# Patient Record
Sex: MALE | Race: BLACK OR AFRICAN AMERICAN | NOT HISPANIC OR LATINO | URBAN - METROPOLITAN AREA
[De-identification: names, ages, dates, MRNs, and addresses within clinical notes are randomized per-mention and may not be internally consistent; named-entity substitution may affect disease eponyms.]

---

## 2017-07-21 ENCOUNTER — EMERGENCY (EMERGENCY)
Facility: HOSPITAL | Age: 27
LOS: 1 days | Discharge: HOME | End: 2017-07-21
Attending: EMERGENCY MEDICINE | Admitting: EMERGENCY MEDICINE
Payer: MEDICAID

## 2017-07-21 VITALS
TEMPERATURE: 99 F | OXYGEN SATURATION: 97 % | DIASTOLIC BLOOD PRESSURE: 84 MMHG | RESPIRATION RATE: 18 BRPM | HEART RATE: 121 BPM | SYSTOLIC BLOOD PRESSURE: 153 MMHG

## 2017-07-21 PROCEDURE — 93010 ELECTROCARDIOGRAM REPORT: CPT

## 2017-07-21 PROCEDURE — 99285 EMERGENCY DEPT VISIT HI MDM: CPT | Mod: 25

## 2017-07-21 NOTE — ED ADULT TRIAGE NOTE - CHIEF COMPLAINT QUOTE
Pt states he had ambulatory surgery on his L shoulder today and since arriving home has been experiencing palpitations.  Pt denies CP/SOB.

## 2017-07-22 VITALS
SYSTOLIC BLOOD PRESSURE: 155 MMHG | DIASTOLIC BLOOD PRESSURE: 80 MMHG | RESPIRATION RATE: 18 BRPM | OXYGEN SATURATION: 100 % | HEART RATE: 99 BPM

## 2017-07-22 DIAGNOSIS — M75.102 UNSPECIFIED ROTATOR CUFF TEAR OR RUPTURE OF LEFT SHOULDER, NOT SPECIFIED AS TRAUMATIC: Chronic | ICD-10-CM

## 2017-07-22 LAB
ALBUMIN SERPL ELPH-MCNC: 4.5 G/DL — SIGNIFICANT CHANGE UP (ref 3.3–5)
ALP SERPL-CCNC: 45 U/L — SIGNIFICANT CHANGE UP (ref 40–120)
ALT FLD-CCNC: 81 U/L — HIGH (ref 4–41)
AST SERPL-CCNC: 116 U/L — HIGH (ref 4–40)
BASOPHILS # BLD AUTO: 0.01 K/UL — SIGNIFICANT CHANGE UP (ref 0–0.2)
BASOPHILS NFR BLD AUTO: 0.1 % — SIGNIFICANT CHANGE UP (ref 0–2)
BILIRUB SERPL-MCNC: 0.2 MG/DL — SIGNIFICANT CHANGE UP (ref 0.2–1.2)
BUN SERPL-MCNC: 14 MG/DL — SIGNIFICANT CHANGE UP (ref 7–23)
CALCIUM SERPL-MCNC: 7.4 MG/DL — LOW (ref 8.4–10.5)
CHLORIDE SERPL-SCNC: 97 MMOL/L — LOW (ref 98–107)
CO2 SERPL-SCNC: 24 MMOL/L — SIGNIFICANT CHANGE UP (ref 22–31)
CREAT SERPL-MCNC: 1.1 MG/DL — SIGNIFICANT CHANGE UP (ref 0.5–1.3)
EOSINOPHIL # BLD AUTO: 0 K/UL — SIGNIFICANT CHANGE UP (ref 0–0.5)
EOSINOPHIL NFR BLD AUTO: 0 % — SIGNIFICANT CHANGE UP (ref 0–6)
GLUCOSE SERPL-MCNC: 116 MG/DL — HIGH (ref 70–99)
HCT VFR BLD CALC: 44.5 % — SIGNIFICANT CHANGE UP (ref 39–50)
HGB BLD-MCNC: 14.4 G/DL — SIGNIFICANT CHANGE UP (ref 13–17)
IMM GRANULOCYTES # BLD AUTO: 0.05 # — SIGNIFICANT CHANGE UP
IMM GRANULOCYTES NFR BLD AUTO: 0.5 % — SIGNIFICANT CHANGE UP (ref 0–1.5)
LYMPHOCYTES # BLD AUTO: 1.28 K/UL — SIGNIFICANT CHANGE UP (ref 1–3.3)
LYMPHOCYTES # BLD AUTO: 13.6 % — SIGNIFICANT CHANGE UP (ref 13–44)
MCHC RBC-ENTMCNC: 26.1 PG — LOW (ref 27–34)
MCHC RBC-ENTMCNC: 32.4 % — SIGNIFICANT CHANGE UP (ref 32–36)
MCV RBC AUTO: 80.6 FL — SIGNIFICANT CHANGE UP (ref 80–100)
MONOCYTES # BLD AUTO: 0.43 K/UL — SIGNIFICANT CHANGE UP (ref 0–0.9)
MONOCYTES NFR BLD AUTO: 4.6 % — SIGNIFICANT CHANGE UP (ref 2–14)
NEUTROPHILS # BLD AUTO: 7.62 K/UL — HIGH (ref 1.8–7.4)
NEUTROPHILS NFR BLD AUTO: 81.2 % — HIGH (ref 43–77)
NRBC # FLD: 0 — SIGNIFICANT CHANGE UP
PLATELET # BLD AUTO: 315 K/UL — SIGNIFICANT CHANGE UP (ref 150–400)
PMV BLD: 10.6 FL — SIGNIFICANT CHANGE UP (ref 7–13)
POTASSIUM SERPL-MCNC: SIGNIFICANT CHANGE UP MMOL/L (ref 3.5–5.3)
POTASSIUM SERPL-SCNC: SIGNIFICANT CHANGE UP MMOL/L (ref 3.5–5.3)
PROT SERPL-MCNC: 9.7 G/DL — HIGH (ref 6–8.3)
RBC # BLD: 5.52 M/UL — SIGNIFICANT CHANGE UP (ref 4.2–5.8)
RBC # FLD: 17 % — HIGH (ref 10.3–14.5)
SODIUM SERPL-SCNC: 129 MMOL/L — LOW (ref 135–145)
TSH SERPL-MCNC: 0.48 UIU/ML — SIGNIFICANT CHANGE UP (ref 0.27–4.2)
WBC # BLD: 9.39 K/UL — SIGNIFICANT CHANGE UP (ref 3.8–10.5)
WBC # FLD AUTO: 9.39 K/UL — SIGNIFICANT CHANGE UP (ref 3.8–10.5)

## 2017-07-22 RX ORDER — SODIUM CHLORIDE 9 MG/ML
2000 INJECTION INTRAMUSCULAR; INTRAVENOUS; SUBCUTANEOUS ONCE
Qty: 0 | Refills: 0 | Status: COMPLETED | OUTPATIENT
Start: 2017-07-22 | End: 2017-07-22

## 2017-07-22 RX ADMIN — SODIUM CHLORIDE 2000 MILLILITER(S): 9 INJECTION INTRAMUSCULAR; INTRAVENOUS; SUBCUTANEOUS at 00:46

## 2017-07-22 NOTE — ED ADULT NURSE NOTE - OBJECTIVE STATEMENT
27 years old male s/p left shoulder surgery this morning presents with complaints of palpitations for a duration of 2hrs30 mins today. Denies fever, chills. On arrival, patient is alert and oriented x 4, clear speech, no use of accessory muscles noted. left shoulder dressing dry and intact, left arm brace in place, 20 gauge saline lock inserted on right metacarpal, blood drawn and sent. Will follow up and monitor.

## 2017-07-22 NOTE — ED SUB INTERN NOTE - MEDICAL DECISION MAKING DETAILS
Due to previous occurances when hot, patients limited water intake, and the patients comfortable appearance and improvement of symptoms, this is likely tachycardia due to dehydration. Patient will get 500ml of NS and will be watched for improvement. PE, arrythmia, and anemia were all considered. Patient will also get a TSH to screen for thyroid dysfunction as a cause for the tachycardia and increased BP.

## 2017-07-22 NOTE — ED SUB INTERN NOTE - OBJECTIVE STATEMENT FT
26yo M with no PMH and h/o rotator cuff surgery yesterday presenting with "heart racing in chest" that began around 5pm and lasted 2.5 hours. Patient is currently not experiencing sxs. Patient denies any dizziness, HA, SOB, CP or tightness, n/v, fever, diaphoresis, or leg pain. Patient reports 2 previous events when the weather has been hot. No h/o early cardiac death in the family. Patient had an EKG that showed sinus tachycardia in triage. Patient reports that water intake recently has been minimal. Patient takes no medications and reports no allergies. 26yo M with no PMH and h/o rotator cuff surgery yesterday presenting with "heart racing in chest" that began around 5pm. Patient is currently not experiencing sxs. Patient denies any dizziness, HA, SOB, CP or tightness, n/v, fever, diaphoresis, or leg pain. Patient reports 2 previous events when the weather has been hot. No h/o early cardiac death in the family. Patient had an EKG that showed sinus tachycardia in triage. Patient reports that water intake recently has been minimal. Patient takes no medications and reports no allergies.

## 2017-07-22 NOTE — ED PROVIDER NOTE - CONSTITUTIONAL, MLM
normal... Well appearing, DMM, awake, alert, oriented to person, place, time/situation and in no apparent distress.

## 2017-07-22 NOTE — ED PROVIDER NOTE - OBJECTIVE STATEMENT
26yo M with no significant past medical history except rotator cuff surgery today (not sure but LEFT shoulder surgery) presenting with palpitations that began around 5pm. Patient is currently asymptomatic. Patient denies any dizziness, HA, SOB, chest pain or tightness, n/v, fever, diaphoresis, or leg pain. Patient reports 2 previous similar events when the weather has been hot. No h/o early cardiac death in the family. Patient had an EKG that showed sinus tachycardia in triage. Patient reports that water intake recently has been minimal. NPO from midnight last night till post-op for surgery today at 12:30pm.  Patient takes no medications 9including no pain meds) and reports no allergies.

## 2017-07-22 NOTE — ED ADULT NURSE NOTE - CHPI ED SYMPTOMS NEG
no syncope/no nausea/no dizziness/no diaphoresis/no fever/no cough/no back pain/no chest pain/no chills/no shortness of breath/no vomiting

## 2017-11-01 ENCOUNTER — OUTPATIENT (OUTPATIENT)
Dept: OUTPATIENT SERVICES | Facility: HOSPITAL | Age: 27
LOS: 1 days | End: 2017-11-01
Payer: MEDICAID

## 2017-11-01 DIAGNOSIS — M75.102 UNSPECIFIED ROTATOR CUFF TEAR OR RUPTURE OF LEFT SHOULDER, NOT SPECIFIED AS TRAUMATIC: Chronic | ICD-10-CM

## 2017-11-01 PROCEDURE — G9001: CPT

## 2017-11-13 ENCOUNTER — EMERGENCY (EMERGENCY)
Facility: HOSPITAL | Age: 27
LOS: 1 days | Discharge: HOME | End: 2017-11-13
Admitting: EMERGENCY MEDICINE
Payer: MEDICAID

## 2017-11-13 VITALS
SYSTOLIC BLOOD PRESSURE: 153 MMHG | DIASTOLIC BLOOD PRESSURE: 71 MMHG | RESPIRATION RATE: 19 BRPM | HEART RATE: 100 BPM | TEMPERATURE: 99 F | OXYGEN SATURATION: 99 %

## 2017-11-13 DIAGNOSIS — M75.102 UNSPECIFIED ROTATOR CUFF TEAR OR RUPTURE OF LEFT SHOULDER, NOT SPECIFIED AS TRAUMATIC: Chronic | ICD-10-CM

## 2017-11-13 PROCEDURE — 99283 EMERGENCY DEPT VISIT LOW MDM: CPT

## 2017-11-13 RX ORDER — IBUPROFEN 200 MG
600 TABLET ORAL ONCE
Qty: 0 | Refills: 0 | Status: COMPLETED | OUTPATIENT
Start: 2017-11-13 | End: 2017-11-13

## 2017-11-13 RX ORDER — CIPROFLOXACIN AND DEXAMETHASONE 3; 1 MG/ML; MG/ML
4 SUSPENSION/ DROPS AURICULAR (OTIC)
Qty: 1 | Refills: 0 | OUTPATIENT
Start: 2017-11-13 | End: 2017-11-20

## 2017-11-13 RX ADMIN — Medication 600 MILLIGRAM(S): at 22:55

## 2017-11-13 NOTE — ED ADULT TRIAGE NOTE - CHIEF COMPLAINT QUOTE
pt c/o left ear ache and yellow drainage x 2 days, was seen by pmd, who said "it might be an infection," did not prescribe any abx. pt admits to intermittent fevers, afebrile in triage. appears comfortable and in nad.

## 2017-11-13 NOTE — ED PROVIDER NOTE - CHPI ED SYMPTOMS NEG
no dizziness/no numbness/no nausea/no chills/no vomiting/no decreased eating/drinking/no weakness/no tingling

## 2017-11-13 NOTE — ED PROVIDER NOTE - ENMT NEGATIVE STATEMENT, MLM
Ears: + L ear pain and discharge, and no hearing problems.Nose: no nasal congestion and no nasal drainage.Mouth/Throat: no dysphagia, no hoarseness and no throat pain.Neck: no lumps, no pain, no stiffness and no swollen glands.

## 2017-11-13 NOTE — ED PROVIDER NOTE - ENMT, MLM
Airway patent, Nasal mucosa clear. Mouth with normal mucosa. Throat has no vesicles, no oropharyngeal exudates and uvula is midline.  L tm erythematous and bulging, mild canal narrowing, +tragus tenderness, clear discharge in L ear canal. No bleeding. No facial swelling. throat clear. No e/o pta or rpa.

## 2017-11-13 NOTE — ED PROVIDER NOTE - MEDICAL DECISION MAKING DETAILS
28 yo M here for L ear pain and discharge. Otherwise well. Afebrile in ED. AOM and AOE on exam, will dc with augmentin and ciprodex drops. motrin tylenol prn. pmd follow up.

## 2017-11-13 NOTE — ED PROVIDER NOTE - CARE PLAN
Principal Discharge DX:	Otalgia  Instructions for follow-up, activity and diet:	Take augmentin and ciprodex as prescribed. Rest, drink plenty of fluids.  Advance activity as tolerated.  Continue all previously prescribed medications as directed. You can use motrin 600mg every 6-8 hours for pain or fever, and/or Tylenol 650 mg every 4 hours for pain/fever. Follow up with your primary care physician in 48-72 hours- bring copies of your results.  Return to the emergency department for chest pain, shortness of breath, dizziness, or worsening, concerning, or persistent symptoms.

## 2017-11-13 NOTE — ED PROVIDER NOTE - PLAN OF CARE
Take augmentin and ciprodex as prescribed. Rest, drink plenty of fluids.  Advance activity as tolerated.  Continue all previously prescribed medications as directed. You can use motrin 600mg every 6-8 hours for pain or fever, and/or Tylenol 650 mg every 4 hours for pain/fever. Follow up with your primary care physician in 48-72 hours- bring copies of your results.  Return to the emergency department for chest pain, shortness of breath, dizziness, or worsening, concerning, or persistent symptoms.

## 2017-11-13 NOTE — ED PROVIDER NOTE - OBJECTIVE STATEMENT
26 yo M no pmhx here for L ear pain and discharge x 2 days. Reports went to PMD at onset, told "might be an infection" but not given any meds. His girlfriend gave him antibiotic ear drops which he has used twice. Reports tactile temp. No other meds taken. Denies hx of ear infection in past. Denies swimming. Denies chills vomiting diarrhea cp sob weakness dizziness dysphagia drooling rash

## 2017-11-27 ENCOUNTER — EMERGENCY (EMERGENCY)
Facility: HOSPITAL | Age: 27
LOS: 1 days | Discharge: HOME | End: 2017-11-27
Attending: EMERGENCY MEDICINE | Admitting: EMERGENCY MEDICINE
Payer: MEDICAID

## 2017-11-27 VITALS
RESPIRATION RATE: 18 BRPM | SYSTOLIC BLOOD PRESSURE: 125 MMHG | DIASTOLIC BLOOD PRESSURE: 68 MMHG | HEART RATE: 75 BPM | TEMPERATURE: 98 F | OXYGEN SATURATION: 100 %

## 2017-11-27 VITALS
SYSTOLIC BLOOD PRESSURE: 134 MMHG | TEMPERATURE: 98 F | HEART RATE: 78 BPM | OXYGEN SATURATION: 100 % | RESPIRATION RATE: 18 BRPM | DIASTOLIC BLOOD PRESSURE: 84 MMHG

## 2017-11-27 DIAGNOSIS — M75.102 UNSPECIFIED ROTATOR CUFF TEAR OR RUPTURE OF LEFT SHOULDER, NOT SPECIFIED AS TRAUMATIC: Chronic | ICD-10-CM

## 2017-11-27 LAB
ALBUMIN SERPL ELPH-MCNC: 4.4 G/DL — SIGNIFICANT CHANGE UP (ref 3.3–5)
ALP SERPL-CCNC: 74 U/L — SIGNIFICANT CHANGE UP (ref 40–120)
ALT FLD-CCNC: 83 U/L — HIGH (ref 4–41)
AST SERPL-CCNC: 40 U/L — SIGNIFICANT CHANGE UP (ref 4–40)
BASOPHILS # BLD AUTO: 0.04 K/UL — SIGNIFICANT CHANGE UP (ref 0–0.2)
BASOPHILS NFR BLD AUTO: 0.4 % — SIGNIFICANT CHANGE UP (ref 0–2)
BILIRUB SERPL-MCNC: 0.3 MG/DL — SIGNIFICANT CHANGE UP (ref 0.2–1.2)
BUN SERPL-MCNC: 15 MG/DL — SIGNIFICANT CHANGE UP (ref 7–23)
CALCIUM SERPL-MCNC: 9.1 MG/DL — SIGNIFICANT CHANGE UP (ref 8.4–10.5)
CHLORIDE SERPL-SCNC: 101 MMOL/L — SIGNIFICANT CHANGE UP (ref 98–107)
CO2 SERPL-SCNC: 23 MMOL/L — SIGNIFICANT CHANGE UP (ref 22–31)
CREAT SERPL-MCNC: 1.06 MG/DL — SIGNIFICANT CHANGE UP (ref 0.5–1.3)
EOSINOPHIL # BLD AUTO: 0.09 K/UL — SIGNIFICANT CHANGE UP (ref 0–0.5)
EOSINOPHIL NFR BLD AUTO: 1 % — SIGNIFICANT CHANGE UP (ref 0–6)
GLUCOSE SERPL-MCNC: 83 MG/DL — SIGNIFICANT CHANGE UP (ref 70–99)
HCT VFR BLD CALC: 44.6 % — SIGNIFICANT CHANGE UP (ref 39–50)
HGB BLD-MCNC: 14.5 G/DL — SIGNIFICANT CHANGE UP (ref 13–17)
HIV1 AG SER QL: SIGNIFICANT CHANGE UP
HIV1+2 AB SPEC QL: SIGNIFICANT CHANGE UP
IMM GRANULOCYTES # BLD AUTO: 0.07 # — SIGNIFICANT CHANGE UP
IMM GRANULOCYTES NFR BLD AUTO: 0.8 % — SIGNIFICANT CHANGE UP (ref 0–1.5)
LYMPHOCYTES # BLD AUTO: 4.1 K/UL — HIGH (ref 1–3.3)
LYMPHOCYTES # BLD AUTO: 45.1 % — HIGH (ref 13–44)
MCHC RBC-ENTMCNC: 26.1 PG — LOW (ref 27–34)
MCHC RBC-ENTMCNC: 32.5 % — SIGNIFICANT CHANGE UP (ref 32–36)
MCV RBC AUTO: 80.4 FL — SIGNIFICANT CHANGE UP (ref 80–100)
MONOCYTES # BLD AUTO: 0.53 K/UL — SIGNIFICANT CHANGE UP (ref 0–0.9)
MONOCYTES NFR BLD AUTO: 5.8 % — SIGNIFICANT CHANGE UP (ref 2–14)
NEUTROPHILS # BLD AUTO: 4.26 K/UL — SIGNIFICANT CHANGE UP (ref 1.8–7.4)
NEUTROPHILS NFR BLD AUTO: 46.9 % — SIGNIFICANT CHANGE UP (ref 43–77)
NRBC # FLD: 0 — SIGNIFICANT CHANGE UP
PLATELET # BLD AUTO: 322 K/UL — SIGNIFICANT CHANGE UP (ref 150–400)
PMV BLD: 9.7 FL — SIGNIFICANT CHANGE UP (ref 7–13)
POTASSIUM SERPL-MCNC: 4 MMOL/L — SIGNIFICANT CHANGE UP (ref 3.5–5.3)
POTASSIUM SERPL-SCNC: 4 MMOL/L — SIGNIFICANT CHANGE UP (ref 3.5–5.3)
PROT SERPL-MCNC: 7.9 G/DL — SIGNIFICANT CHANGE UP (ref 6–8.3)
RBC # BLD: 5.55 M/UL — SIGNIFICANT CHANGE UP (ref 4.2–5.8)
RBC # FLD: 14.7 % — HIGH (ref 10.3–14.5)
SODIUM SERPL-SCNC: 139 MMOL/L — SIGNIFICANT CHANGE UP (ref 135–145)
WBC # BLD: 9.09 K/UL — SIGNIFICANT CHANGE UP (ref 3.8–10.5)
WBC # FLD AUTO: 9.09 K/UL — SIGNIFICANT CHANGE UP (ref 3.8–10.5)

## 2017-11-27 PROCEDURE — 70450 CT HEAD/BRAIN W/O DYE: CPT | Mod: 26

## 2017-11-27 PROCEDURE — 99285 EMERGENCY DEPT VISIT HI MDM: CPT

## 2017-11-27 RX ORDER — FLUTICASONE PROPIONATE 50 MCG
1 SPRAY, SUSPENSION NASAL
Qty: 1 | Refills: 0 | OUTPATIENT
Start: 2017-11-27 | End: 2017-12-27

## 2017-11-27 NOTE — ED PROVIDER NOTE - PROGRESS NOTE DETAILS
PA Baseil: Pt seen and examined by ent- no evidence of infxn, no pain. Does not appear to be mastoiditis- recommend flonase, ent f/u. Pt stable for dc home and amenable to plan

## 2017-11-27 NOTE — ED ADULT TRIAGE NOTE - CHIEF COMPLAINT QUOTE
Pt states he was seen here 2 weeks ago for left ear infection, and told to come back for a follow-up. Pt reports discomfort in the ear, denies fever

## 2017-11-27 NOTE — CONSULT NOTE ADULT - ASSESSMENT
A/P 27M with AU muffled speech, decreased hearing and clogged ear sensation suggestive of Eustachian tube dysfunction in the setting of recent URI. R TM retracted with trace effusion. L EAC with white debris and TM coated with white debris suggestive of recent ciprodex use. No evidence of active OM/OE.  - flonase BID to bilateral nares  - follow up with ENT in clinic in 1 month. Will discuss need for audiogram at that time, if persistent symptoms  - return to ED if symptoms worsen   - discussed with attending

## 2017-11-27 NOTE — ED PROVIDER NOTE - PLAN OF CARE
Rest, drink plenty of fluids  Advance activity as tolerated  Use flonase- one spray in each nostril two times a day  Continue all previously prescribed medications as directed  Follow up with your PMD 2-3 days- bring copies of your results  Follow up in ENT clinic 1 month- call for an appointment: [phone]  Return to the ER for worsening

## 2017-11-27 NOTE — ED PROVIDER NOTE - CARE PLAN
Principal Discharge DX:	Ear pain  Instructions for follow-up, activity and diet:	Rest, drink plenty of fluids  Advance activity as tolerated  Use flonase- one spray in each nostril two times a day  Continue all previously prescribed medications as directed  Follow up with your PMD 2-3 days- bring copies of your results  Follow up in ENT clinic 1 month- call for an appointment: [phone]  Return to the ER for worsening

## 2017-11-27 NOTE — CONSULT NOTE ADULT - SUBJECTIVE AND OBJECTIVE BOX
HPI: 27M with no sig PMHx presenting with complaints of AU decreased hearing, muffled speech and clogged sensation. Patient states ears feel similarly clogged to when he is on an airplane. Reports URI symptoms 2 weeks ago, with symptoms of sore throat, nasal congestion, cough which are still resolving. 2 weeks ago he presented to the ED with left ear pain and was prescribed augmentin and ciprodex drops for presumed OM. Ear pain has not resolved, but pt reports he is still using the ciprodex drops. Reports intermittent AS tinnitus and fevers (Tm 101.1 yesterday) at home. Currently he denies any otalgia, otorrhea, vertigo. Reports sinus infection 6 months ago treated with nasal saline rinses and abx. Denies prior ear surgeries, prior hx Eustachian tube dysfunction, denies ototoxic exposure.  PMHx: denies  PSurgHx: left rotator cuff repair  Medications: denies  Allergies: NKDA    Phys Exam  VSS  NAD, awake, cooperative  EOM intact  CN7 intact bilaterally  AD: mastoid non-tender, pinna wnl, EAC clear, TM intact with retracted appearance and trace serous effusion  AS: mastoid non-tender, pinna wnl, EAC with white debris, TM coated with white debris and slight retracted appearance, unable to visualize if effusion present  Nose: nares patent anteriorly, mild inferior turbinate hypertrophy, rhinorrhea  OC/OP: tongue wnl, FOM soft/flat, tonsils 3+  (left tonsillar appendage but overall symmetric), uvula midline, posterior OP clear  Neck: soft, flat, no cervical tenderness, trachea midline  Breathing comfortably on room air, no stridor or stertor    CT Head Reviewed  IMPRESSION:  No  mass  effect  hemorrhage  or  evidence  of  acute  intracranial  pathology.    Nonspecific  soft  tissue  within  left-sided  mastoid  air  cells.  Although  no  bone  erosion  is  seen  on  the  available  images,  clinical  correlation  will  determine  the  need  for  thin  section  CT  of  the  temporal  bone  for  further  evaluation.

## 2017-11-27 NOTE — ED PROVIDER NOTE - MEDICAL DECISION MAKING DETAILS
28 y/o M w/ recent dx of AOM and AOE on left side, dc'd w/ Augmentin and Ciprodex drops, returns stating sx never improved but actually worsened, now associated w/ increased pain and hearing loss. Ttp of left mastoid, therefore will CT to r/o mastoiditis and likely need ENT consult. 28 y/o M w/ recent dx of AOM and AOE on left side, dc'd w/ Augmentin and Ciprodex drops, returns stating sx never improved but actually worsened, now associated w/ increased pain and hearing loss. Ttp of left mastoid, therefore will CT to r/o mastoiditis and likely need ENT consult for refractory b/l AOM and left AOE w/ hearing loss.

## 2017-11-27 NOTE — ED PROVIDER NOTE - OBJECTIVE STATEMENT
26 y/o M w/ no significant PMHx, presents to the ED c/o left ear pain w/ yellowish drainage x2 weeks. Pt was seen in ED on 11/13/17, dx w/ left ear infection and Rx Augmentin and Ciprodex drops. Pt states he was compliant w/ the medications w/ no relief of sx. Pt notes ear pain has been persistent and now has b/l decreased hearing. Endorses use of Advil 400mg this morning w/ no relief. Pt also reports intermittent fever. Denies nausea, vomiting, chills or any other complaints. NKDA.

## 2017-11-28 DIAGNOSIS — R69 ILLNESS, UNSPECIFIED: ICD-10-CM

## 2018-08-02 ENCOUNTER — EMERGENCY (EMERGENCY)
Facility: HOSPITAL | Age: 28
LOS: 1 days | Discharge: HOME | End: 2018-08-02
Admitting: EMERGENCY MEDICINE
Payer: SELF-PAY

## 2018-08-02 VITALS
DIASTOLIC BLOOD PRESSURE: 66 MMHG | TEMPERATURE: 99 F | HEART RATE: 80 BPM | SYSTOLIC BLOOD PRESSURE: 128 MMHG | OXYGEN SATURATION: 98 % | RESPIRATION RATE: 16 BRPM

## 2018-08-02 DIAGNOSIS — M75.102 UNSPECIFIED ROTATOR CUFF TEAR OR RUPTURE OF LEFT SHOULDER, NOT SPECIFIED AS TRAUMATIC: Chronic | ICD-10-CM

## 2018-08-02 LAB
ALBUMIN SERPL ELPH-MCNC: 4.1 G/DL — SIGNIFICANT CHANGE UP (ref 3.3–5)
ALP SERPL-CCNC: 71 U/L — SIGNIFICANT CHANGE UP (ref 40–120)
ALT FLD-CCNC: 94 U/L — HIGH (ref 4–41)
AST SERPL-CCNC: 75 U/L — HIGH (ref 4–40)
BASE EXCESS BLDV CALC-SCNC: 1.8 MMOL/L — SIGNIFICANT CHANGE UP
BASOPHILS # BLD AUTO: 0.03 K/UL — SIGNIFICANT CHANGE UP (ref 0–0.2)
BASOPHILS NFR BLD AUTO: 0.4 % — SIGNIFICANT CHANGE UP (ref 0–2)
BILIRUB SERPL-MCNC: 0.3 MG/DL — SIGNIFICANT CHANGE UP (ref 0.2–1.2)
BLOOD GAS VENOUS - CREATININE: 0.98 MG/DL — SIGNIFICANT CHANGE UP (ref 0.5–1.3)
BUN SERPL-MCNC: 13 MG/DL — SIGNIFICANT CHANGE UP (ref 7–23)
CALCIUM SERPL-MCNC: 9.5 MG/DL — SIGNIFICANT CHANGE UP (ref 8.4–10.5)
CHLORIDE BLDV-SCNC: 106 MMOL/L — SIGNIFICANT CHANGE UP (ref 96–108)
CHLORIDE SERPL-SCNC: 101 MMOL/L — SIGNIFICANT CHANGE UP (ref 98–107)
CO2 SERPL-SCNC: 23 MMOL/L — SIGNIFICANT CHANGE UP (ref 22–31)
CREAT SERPL-MCNC: 1 MG/DL — SIGNIFICANT CHANGE UP (ref 0.5–1.3)
EOSINOPHIL # BLD AUTO: 0.07 K/UL — SIGNIFICANT CHANGE UP (ref 0–0.5)
EOSINOPHIL NFR BLD AUTO: 0.9 % — SIGNIFICANT CHANGE UP (ref 0–6)
GAS PNL BLDV: 136 MMOL/L — SIGNIFICANT CHANGE UP (ref 136–146)
GLUCOSE BLDV-MCNC: 77 — SIGNIFICANT CHANGE UP (ref 70–99)
GLUCOSE SERPL-MCNC: 73 MG/DL — SIGNIFICANT CHANGE UP (ref 70–99)
HCO3 BLDV-SCNC: 26 MMOL/L — SIGNIFICANT CHANGE UP (ref 20–27)
HCT VFR BLD CALC: 42.7 % — SIGNIFICANT CHANGE UP (ref 39–50)
HCT VFR BLDV CALC: 43.6 % — SIGNIFICANT CHANGE UP (ref 39–51)
HGB BLD-MCNC: 13.7 G/DL — SIGNIFICANT CHANGE UP (ref 13–17)
HGB BLDV-MCNC: 14.2 G/DL — SIGNIFICANT CHANGE UP (ref 13–17)
IMM GRANULOCYTES # BLD AUTO: 0.05 # — SIGNIFICANT CHANGE UP
IMM GRANULOCYTES NFR BLD AUTO: 0.7 % — SIGNIFICANT CHANGE UP (ref 0–1.5)
LACTATE BLDV-MCNC: 1 MMOL/L — SIGNIFICANT CHANGE UP (ref 0.5–2)
LIDOCAIN IGE QN: 33.5 U/L — SIGNIFICANT CHANGE UP (ref 7–60)
LYMPHOCYTES # BLD AUTO: 3.1 K/UL — SIGNIFICANT CHANGE UP (ref 1–3.3)
LYMPHOCYTES # BLD AUTO: 41 % — SIGNIFICANT CHANGE UP (ref 13–44)
MCHC RBC-ENTMCNC: 25.7 PG — LOW (ref 27–34)
MCHC RBC-ENTMCNC: 32.1 % — SIGNIFICANT CHANGE UP (ref 32–36)
MCV RBC AUTO: 80.1 FL — SIGNIFICANT CHANGE UP (ref 80–100)
MONOCYTES # BLD AUTO: 0.58 K/UL — SIGNIFICANT CHANGE UP (ref 0–0.9)
MONOCYTES NFR BLD AUTO: 7.7 % — SIGNIFICANT CHANGE UP (ref 2–14)
NEUTROPHILS # BLD AUTO: 3.73 K/UL — SIGNIFICANT CHANGE UP (ref 1.8–7.4)
NEUTROPHILS NFR BLD AUTO: 49.3 % — SIGNIFICANT CHANGE UP (ref 43–77)
NRBC # FLD: 0 — SIGNIFICANT CHANGE UP
PCO2 BLDV: 45 MMHG — SIGNIFICANT CHANGE UP (ref 41–51)
PH BLDV: 7.39 PH — SIGNIFICANT CHANGE UP (ref 7.32–7.43)
PLATELET # BLD AUTO: 308 K/UL — SIGNIFICANT CHANGE UP (ref 150–400)
PMV BLD: 9.7 FL — SIGNIFICANT CHANGE UP (ref 7–13)
PO2 BLDV: 82 MMHG — HIGH (ref 35–40)
POTASSIUM BLDV-SCNC: 4 MMOL/L — SIGNIFICANT CHANGE UP (ref 3.4–4.5)
POTASSIUM SERPL-MCNC: 4.1 MMOL/L — SIGNIFICANT CHANGE UP (ref 3.5–5.3)
POTASSIUM SERPL-SCNC: 4.1 MMOL/L — SIGNIFICANT CHANGE UP (ref 3.5–5.3)
PROT SERPL-MCNC: 7.7 G/DL — SIGNIFICANT CHANGE UP (ref 6–8.3)
RBC # BLD: 5.33 M/UL — SIGNIFICANT CHANGE UP (ref 4.2–5.8)
RBC # FLD: 14.7 % — HIGH (ref 10.3–14.5)
SAO2 % BLDV: 95.8 % — HIGH (ref 60–85)
SODIUM SERPL-SCNC: 137 MMOL/L — SIGNIFICANT CHANGE UP (ref 135–145)
WBC # BLD: 7.56 K/UL — SIGNIFICANT CHANGE UP (ref 3.8–10.5)
WBC # FLD AUTO: 7.56 K/UL — SIGNIFICANT CHANGE UP (ref 3.8–10.5)

## 2018-08-02 PROCEDURE — 99284 EMERGENCY DEPT VISIT MOD MDM: CPT

## 2018-08-02 PROCEDURE — 74177 CT ABD & PELVIS W/CONTRAST: CPT | Mod: 26

## 2018-08-02 RX ORDER — SODIUM CHLORIDE 9 MG/ML
1000 INJECTION INTRAMUSCULAR; INTRAVENOUS; SUBCUTANEOUS ONCE
Qty: 0 | Refills: 0 | Status: DISCONTINUED | OUTPATIENT
Start: 2018-08-02 | End: 2018-08-02

## 2018-08-02 RX ORDER — SODIUM CHLORIDE 9 MG/ML
2000 INJECTION INTRAMUSCULAR; INTRAVENOUS; SUBCUTANEOUS ONCE
Qty: 0 | Refills: 0 | Status: COMPLETED | OUTPATIENT
Start: 2018-08-02 | End: 2018-08-02

## 2018-08-02 RX ADMIN — SODIUM CHLORIDE 2000 MILLILITER(S): 9 INJECTION INTRAMUSCULAR; INTRAVENOUS; SUBCUTANEOUS at 17:41

## 2018-08-02 NOTE — ED PROVIDER NOTE - PROGRESS NOTE DETAILS
MAYRA Lau; Spoke with radiology resident, dx epiploic appendigitis not appendicitis, states they will edit report. Patient safe for D/C with outpatient F/U.

## 2018-08-02 NOTE — ED PROVIDER NOTE - CARE PLAN
Principal Discharge DX:	Epiploic appendagitis Principal Discharge DX:	Epiploic appendagitis  Assessment and plan of treatment:	Follow up with your primary doctor. Bring copies of your results. Your liver enzymes are elevated and need to be repeated. Take Ibuprofen for your pain. Rest, drink plenty of fluids.  Advance activity as tolerated.  Continue all previously prescribed medications as directed.  Follow up with your primary care physician in 48-72 hours- bring copies of your results.  Return to the ER for worsening or persistent symptoms, and/or ANY NEW OR CONCERNING SYMPTOMS. If you have issues obtaining follow up, please call: [phone] to obtain a doctor or specialist who takes your insurance in your area.  You may call [phone] to make an appointment with the internal medicine clinic.  Secondary Diagnosis:	Elevated liver enzymes

## 2018-08-02 NOTE — ED PROVIDER NOTE - PLAN OF CARE
Follow up with your primary doctor. Bring copies of your results. Your liver enzymes are elevated and need to be repeated. Take Ibuprofen for your pain. Rest, drink plenty of fluids.  Advance activity as tolerated.  Continue all previously prescribed medications as directed.  Follow up with your primary care physician in 48-72 hours- bring copies of your results.  Return to the ER for worsening or persistent symptoms, and/or ANY NEW OR CONCERNING SYMPTOMS. If you have issues obtaining follow up, please call: [phone] to obtain a doctor or specialist who takes your insurance in your area.  You may call [phone] to make an appointment with the internal medicine clinic.

## 2018-08-02 NOTE — ED PROVIDER NOTE - OBJECTIVE STATEMENT
29 Y/O M no PMH C/O 3 days of LLQ, Maribeth-bladder and diffuse abdominal pain, vomiting 2 days ago and a rectal bulge which he thinks disappeared which he defers examination of stating "lets get a CT scan first." PSH Herniated disk, L shoulder surgery. He states he has not been eating as much for the past few weeks. Denies fever, chills, nightsweats or other acute symptoms. Denies recent travel, abx use, hospitalization, well water or other infectious diarrhea risk factors. Pt denies any other complaints such as CP SOB ABD PN N V D Dizz or any other acute symptoms.

## 2018-08-02 NOTE — ED PROVIDER NOTE - MEDICAL DECISION MAKING DETAILS
Pt denies rectal exam: CT to R/O abscess vs hemorrhoid, CT to evaluate for colitis and R/O atypical appendicis presentation and R/O Diverticulitis. Labs and fluids also ordered.

## 2018-09-22 NOTE — ED ADULT TRIAGE NOTE - AS O2 DELIVERY
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

## 2019-01-18 ENCOUNTER — EMERGENCY (EMERGENCY)
Facility: HOSPITAL | Age: 29
LOS: 1 days | Discharge: HOME | End: 2019-01-18
Admitting: EMERGENCY MEDICINE
Payer: SELF-PAY

## 2019-01-18 VITALS
SYSTOLIC BLOOD PRESSURE: 154 MMHG | DIASTOLIC BLOOD PRESSURE: 92 MMHG | TEMPERATURE: 98 F | HEART RATE: 93 BPM | RESPIRATION RATE: 16 BRPM

## 2019-01-18 DIAGNOSIS — M75.102 UNSPECIFIED ROTATOR CUFF TEAR OR RUPTURE OF LEFT SHOULDER, NOT SPECIFIED AS TRAUMATIC: Chronic | ICD-10-CM

## 2019-01-18 PROCEDURE — 99284 EMERGENCY DEPT VISIT MOD MDM: CPT

## 2019-01-18 PROCEDURE — 73030 X-RAY EXAM OF SHOULDER: CPT | Mod: 26,LT

## 2019-01-18 PROCEDURE — 73020 X-RAY EXAM OF SHOULDER: CPT | Mod: 26,LT

## 2019-01-18 RX ORDER — KETOROLAC TROMETHAMINE 30 MG/ML
15 SYRINGE (ML) INJECTION ONCE
Qty: 0 | Refills: 0 | Status: DISCONTINUED | OUTPATIENT
Start: 2019-01-18 | End: 2019-01-18

## 2019-01-18 RX ORDER — CYCLOBENZAPRINE HYDROCHLORIDE 10 MG/1
10 TABLET, FILM COATED ORAL ONCE
Qty: 0 | Refills: 0 | Status: COMPLETED | OUTPATIENT
Start: 2019-01-18 | End: 2019-01-18

## 2019-01-18 RX ADMIN — Medication 15 MILLIGRAM(S): at 21:42

## 2019-01-18 RX ADMIN — CYCLOBENZAPRINE HYDROCHLORIDE 10 MILLIGRAM(S): 10 TABLET, FILM COATED ORAL at 21:42

## 2019-01-18 NOTE — ED ADULT TRIAGE NOTE - AS TEMP SITE
1.  Date of consult: 8/24/18     2.  Reason for consult: complex ovarian cyst    3.  Referring provider/facility: [facility]    4. Scheduled by: pt daughter    5.  Outside records requested from: [facility], [facility] (in Care Everywhere)    6.  Additional Information:       oral

## 2019-01-18 NOTE — ED PROVIDER NOTE - NSFOLLOWUPINSTRUCTIONS_ED_ALL_ED_FT
Patient advised to follow up with PRIMARY CARE DOCTOR AND ORTHOPEDIST WITHIN 72 HOURS  and told to return to the emergency department immediately for any new or concerning symptoms such as worsening pain, numbness, tingling, chest pain, shortness of breath  OR ANY OTHER COMPLAINTS. Patient agrees with plan.      Take medication as directed   DO NOT DRIVE, DRINK ALCOHOL OR OPERATE MACHINERY IF TAKING MUSCLE RELAXER (CAN CAUSE DROWSINESS)    Rest, Ice, keep elevated

## 2019-01-18 NOTE — ED PROVIDER NOTE - PHYSICAL EXAMINATION
Vital signs reviewed.   CONSTITUTIONAL: Well-appearing; well-nourished; in no apparent distress. Non-toxic appearing.   HEAD: Normocephalic, atraumatic.  EYES: PERRL, EOM intact, conjunctiva and sclera WNL.  ENT: normal nose; no rhinorrhea;   NECK: No midline tenderness. Trachea midline.   CARD: Normal S1, S2; no murmurs, rubs, or gallops noted.  RESP: Normal chest excursion with respiration; breath sounds clear and equal bilaterally; no wheezes, rhonchi, or rales.  EXT/MS: moves all extremities, +Discomfort and LROM of Lt shoulder due to pain both active and passive, no crepitus or obvious deformities noted. Compartments soft. ; distal pulses are normal. +TTP noted to Lt trapezius area, deltoid and anterior shoulder.   SKIN: Normal for age and race; warm; dry; good turgor; no apparent lesions or exudate noted. No erythema, ecchymosis, fluctuance, signs of trauma or infection noted.   NEURO: Awake, alert, oriented x 3, no gross deficits, no motor or sensory deficit noted.  PSYCH: Normal mood; appropriate affect.

## 2019-01-18 NOTE — ED PROVIDER NOTE - MEDICAL DECISION MAKING DETAILS
Patient is a 28 y.o male with PMHx of Lt rotator cuff tear and surgical repair 1 year ago who presents to ED c.o Lt shoulder pain x 1 month.  Plan- muscle relaxer, analgesic, xray, will monitor and reassess

## 2019-01-18 NOTE — ED PROVIDER NOTE - OBJECTIVE STATEMENT
HPI: Patient is a 28 y.o male with PMHx of Lt rotator cuff tear and surgical repair 1 year ago who presents to ED c.o Lt shoulder pain x 1 month. States he has been having constant Lt shoulder pain over the past month which is exacerbated with movement, today sx were worse. Admits he was taking motrin which provided some relief but stopped taking it 2 weeks ago. Patient states his mother is physical therapist so he has been doing some exercises with her. Denies recent trauma/falls, neck pain, weakness, cp, sob, pleuritic pain, recent travel, FHX CAD, fevers, chills, swelling, redness or any other complaints. Denies hx smoking.

## 2019-01-18 NOTE — ED PROVIDER NOTE - PROGRESS NOTE DETAILS
Results of xrays reviewed and discussed with patient. No acute fx noted. No dislocations. Pt sx consistent with muscle spasm, plan for dc home with NSAID and muscle relaxer, patient advised to f/u with orthopedist. Sling applied for support. RICE instructions given. Pt understood and agreed with plan. All question and concerns addressed. Strict return instructions given. No complaints noted.

## 2019-01-19 RX ORDER — IBUPROFEN 200 MG
1 TABLET ORAL
Qty: 20 | Refills: 0 | OUTPATIENT
Start: 2019-01-19 | End: 2019-01-23

## 2019-01-19 RX ORDER — CYCLOBENZAPRINE HYDROCHLORIDE 10 MG/1
1 TABLET, FILM COATED ORAL
Qty: 12 | Refills: 0 | OUTPATIENT
Start: 2019-01-19 | End: 2019-01-22

## 2019-04-13 ENCOUNTER — EMERGENCY (EMERGENCY)
Facility: HOSPITAL | Age: 29
LOS: 1 days | Discharge: HOME | End: 2019-04-13
Attending: EMERGENCY MEDICINE | Admitting: EMERGENCY MEDICINE
Payer: SELF-PAY

## 2019-04-13 VITALS
SYSTOLIC BLOOD PRESSURE: 135 MMHG | OXYGEN SATURATION: 98 % | DIASTOLIC BLOOD PRESSURE: 72 MMHG | HEART RATE: 92 BPM | RESPIRATION RATE: 18 BRPM | TEMPERATURE: 98 F

## 2019-04-13 VITALS
HEART RATE: 82 BPM | OXYGEN SATURATION: 99 % | RESPIRATION RATE: 16 BRPM | DIASTOLIC BLOOD PRESSURE: 71 MMHG | TEMPERATURE: 98 F | SYSTOLIC BLOOD PRESSURE: 125 MMHG

## 2019-04-13 DIAGNOSIS — M75.102 UNSPECIFIED ROTATOR CUFF TEAR OR RUPTURE OF LEFT SHOULDER, NOT SPECIFIED AS TRAUMATIC: Chronic | ICD-10-CM

## 2019-04-13 LAB
APPEARANCE UR: CLEAR — SIGNIFICANT CHANGE UP
BILIRUB UR-MCNC: NEGATIVE — SIGNIFICANT CHANGE UP
BLOOD UR QL VISUAL: NEGATIVE — SIGNIFICANT CHANGE UP
COLOR SPEC: SIGNIFICANT CHANGE UP
GLUCOSE UR-MCNC: NEGATIVE — SIGNIFICANT CHANGE UP
HIV COMBO RESULT: SIGNIFICANT CHANGE UP
HIV1+2 AB SPEC QL: SIGNIFICANT CHANGE UP
KETONES UR-MCNC: NEGATIVE — SIGNIFICANT CHANGE UP
LEUKOCYTE ESTERASE UR-ACNC: NEGATIVE — SIGNIFICANT CHANGE UP
NITRITE UR-MCNC: NEGATIVE — SIGNIFICANT CHANGE UP
PH UR: 6 — SIGNIFICANT CHANGE UP (ref 5–8)
PROT UR-MCNC: 10 — SIGNIFICANT CHANGE UP
SP GR SPEC: 1.03 — SIGNIFICANT CHANGE UP (ref 1–1.04)
UROBILINOGEN FLD QL: NORMAL — SIGNIFICANT CHANGE UP

## 2019-04-13 PROCEDURE — 99284 EMERGENCY DEPT VISIT MOD MDM: CPT

## 2019-04-13 RX ORDER — CEFTRIAXONE 500 MG/1
250 INJECTION, POWDER, FOR SOLUTION INTRAMUSCULAR; INTRAVENOUS ONCE
Qty: 0 | Refills: 0 | Status: COMPLETED | OUTPATIENT
Start: 2019-04-13 | End: 2019-04-13

## 2019-04-13 RX ORDER — AZITHROMYCIN 500 MG/1
1000 TABLET, FILM COATED ORAL ONCE
Qty: 0 | Refills: 0 | Status: COMPLETED | OUTPATIENT
Start: 2019-04-13 | End: 2019-04-13

## 2019-04-13 RX ADMIN — AZITHROMYCIN 1000 MILLIGRAM(S): 500 TABLET, FILM COATED ORAL at 15:54

## 2019-04-13 RX ADMIN — CEFTRIAXONE 250 MILLIGRAM(S): 500 INJECTION, POWDER, FOR SOLUTION INTRAMUSCULAR; INTRAVENOUS at 15:55

## 2019-04-13 NOTE — ED PROVIDER NOTE - PROGRESS NOTE DETAILS
Jared PGY2: Patient refused  exam, understood risk of missing testicular infections, masses or abnormal lesions that may require further work up, further testing, ultrasounds or other labwork- offered male physician and patient still refused exam- patient states he will see his PMD about it as he is more comfortable with his own physician. Jared PGY2: Patient evaluated and well appearing, sitting comfortably, no blood or infcn noted on UA, will dc w/ urology f/u, cover for possible STDs while gcchlamydia is pending, and pt aware to await culture

## 2019-04-13 NOTE — ED PROVIDER NOTE - OBJECTIVE STATEMENT
29M no significant pmhx presenting w/ dysuria, hematuria, increased urinary frequency and sensation of pain over the 'area of his bladder' ongoing x 3 days and now with mild right sided low back pain without fevers, chills, nausea, vomiting or stool changes. Does have a history of low back surgery 2 years ago for slipped disc but no numbness/tingling down the legs, ambulatory without difficulty. Pt is sexually active with one partner, no barrier contraception. Denies possibility of STD exposure. No penile discharge, erythema or testicular pain or swelling reported.

## 2019-04-13 NOTE — ED PROVIDER NOTE - NSFOLLOWUPCLINICS_GEN_ALL_ED_FT
[facility] - Urology  Urology  [address], [address] [place] [address]  Phone: [phone]  Fax:   Follow Up Time: 1-3 Days

## 2019-04-13 NOTE — ED PROVIDER NOTE - NSFOLLOWUPINSTRUCTIONS_ED_ALL_ED_FT
You were seen today for painful urination and blood in the urine. We did not find an infection at this time but you will need to follow up with your primary care doctor within 48 hours for a re-evaluation, as well as a urologist to assess why your are having blood in the urine. We were unable to perform a genital exam at this time because you declined it.     You will be given a dose of antibiotics. There are some labs looking for other sources of infection as well as a urine culture in the lab that will not result today.     Please return to the emergency room if pain worsens, if you get abdominal pain, nausea, vomiting or fevers or if there are any new concerning symptoms.

## 2019-04-13 NOTE — ED PROVIDER NOTE - CLINICAL SUMMARY MEDICAL DECISION MAKING FREE TEXT BOX
29M well appearing w/ suprapubic pain, dysuria, and hematuria, will check UA and likely dc on abx, no clinical signs suggestive of pyelo

## 2019-04-13 NOTE — ED PROVIDER NOTE - ATTENDING CONTRIBUTION TO CARE
Dr. Mittal: I have personally performed a face to face bedside history and physical examination of this patient. I have discussed the history, examination, review of systems, assessment and plan of management with the resident. I have reviewed the electronic medical record and amended it to reflect my history, review of systems, physical exam, assessment and plan.      29M with pmh of back surgery  p/w 3d of dysuria, hematuria, frequency and suprapubic tenderness. Pt also report R sided back pain x few weeks, that is worse with movement and especially when driving. Denies focal numbness/weakness, f/c, vomiting.    on exam   GEN - NAD; well appearing; A+O x3   HEAD - NC/AT     EYES - EOMI, no conjunctival pallor, no scleral icterus  ENT -   mucous membranes  moist , no discharge      NECK - Neck supple  PULM - CTA b/l,  symmetric breath sounds  COR -  RRR, S1 S2, no murmurs  ABD - , ND, NT, soft, no guarding, no rebound, no masses    BACK - no CVA tenderness, nontender spine, +ttp at right perilumbar region    EXTREMS - no edema, no deformity, warm and well perfused    SKIN - no rash or bruising      NEUROLOGIC - alert, sensation nl, motor 5/5 RUE/LUE/RLE/LLE    likely urethritis. do not suspect renal stone or pyelo.  also with back spasm vs strain. Plan for UA, send off gc/chlamydia, likely treat for STI.

## 2019-04-15 LAB
BACTERIA UR CULT: SIGNIFICANT CHANGE UP
C TRACH RRNA SPEC QL NAA+PROBE: SIGNIFICANT CHANGE UP
N GONORRHOEA RRNA SPEC QL NAA+PROBE: SIGNIFICANT CHANGE UP
SPECIMEN SOURCE: SIGNIFICANT CHANGE UP
SPECIMEN SOURCE: SIGNIFICANT CHANGE UP

## 2020-10-14 NOTE — ED ADULT TRIAGE NOTE - MODE OF ARRIVAL
Quality 226: Preventive Care And Screening: Tobacco Use: Screening And Cessation Intervention: Patient screened for tobacco use and is an ex/non-smoker Quality 431: Preventive Care And Screening: Unhealthy Alcohol Use - Screening: Patient screened for unhealthy alcohol use using a single question and scores less than 2 times per year Detail Level: Detailed Walk in Quality 130: Documentation Of Current Medications In The Medical Record: Current Medications Documented

## 2020-12-28 ENCOUNTER — EMERGENCY (EMERGENCY)
Facility: HOSPITAL | Age: 30
LOS: 1 days | Discharge: HOME | End: 2020-12-28
Admitting: EMERGENCY MEDICINE
Payer: MEDICAID

## 2020-12-28 VITALS
TEMPERATURE: 98 F | OXYGEN SATURATION: 100 % | DIASTOLIC BLOOD PRESSURE: 71 MMHG | SYSTOLIC BLOOD PRESSURE: 142 MMHG | HEART RATE: 86 BPM | RESPIRATION RATE: 16 BRPM

## 2020-12-28 DIAGNOSIS — M75.102 UNSPECIFIED ROTATOR CUFF TEAR OR RUPTURE OF LEFT SHOULDER, NOT SPECIFIED AS TRAUMATIC: Chronic | ICD-10-CM

## 2020-12-28 PROCEDURE — 99283 EMERGENCY DEPT VISIT LOW MDM: CPT

## 2020-12-28 PROCEDURE — 73030 X-RAY EXAM OF SHOULDER: CPT | Mod: 26,LT

## 2020-12-28 RX ORDER — CYCLOBENZAPRINE HYDROCHLORIDE 10 MG/1
1 TABLET, FILM COATED ORAL
Qty: 9 | Refills: 0
Start: 2020-12-28 | End: 2020-12-30

## 2020-12-28 RX ORDER — ACETAMINOPHEN 500 MG
975 TABLET ORAL ONCE
Refills: 0 | Status: COMPLETED | OUTPATIENT
Start: 2020-12-28 | End: 2020-12-28

## 2020-12-28 RX ORDER — IBUPROFEN 200 MG
600 TABLET ORAL ONCE
Refills: 0 | Status: COMPLETED | OUTPATIENT
Start: 2020-12-28 | End: 2020-12-28

## 2020-12-28 RX ADMIN — Medication 600 MILLIGRAM(S): at 20:01

## 2020-12-28 RX ADMIN — Medication 975 MILLIGRAM(S): at 20:01

## 2020-12-28 NOTE — ED ADULT TRIAGE NOTE - CHIEF COMPLAINT QUOTE
pt here for left shoulder pain x 2 weeks. reports decreased range of motion of the last 2 weeks as well. denies any injuries, falls, or trauma. pt had left side rotator cuff surgery 2 years ago.

## 2020-12-28 NOTE — ED PROVIDER NOTE - CLINICAL SUMMARY MEDICAL DECISION MAKING FREE TEXT BOX
31 y/o male with pmhx of left rotator cuff repair surgery 2 years ago presents to ED c/o left shoulder pain x 2 weeks. Pt states he developed acute onset of pain after lifting heavy boxes. Pt has been taking motrin with minimal relief. Pt states his pain radiates down his left arm. Not exertional. No weakness, numbness, tingling, dizziness. on exam, pt with left trapezius muscle tenderness, no spinal tenderness, no neuro deficits. plan for pain control w/ motrin, tylenol, XR. pt driving home will provide flexeril to pharmacy.

## 2020-12-28 NOTE — ED PROVIDER NOTE - PATIENT PORTAL LINK FT
You can access the FollowMyHealth Patient Portal offered by [facility] by registering at the following website: [email or web address]. By joining [facility]’s FollowMyHealth portal, you will also be able to view your health information using other applications (apps) compatible with our system.

## 2020-12-28 NOTE — ED PROVIDER NOTE - MUSCULOSKELETAL MINIMAL EXAM
+left trapezius muscle tenderness. 5/5 strength b/l pulses 2+. sensation intact./atraumatic/normal range of motion

## 2020-12-28 NOTE — ED PROVIDER NOTE - OBJECTIVE STATEMENT
29 y/o male with pmhx of left rotator cuff repair surgery 2 years ago presents to ED c/o left shoulder pain x 2 weeks. Pt states he developed acute onset of pain after lifting heavy boxes. Pt has been taking motrin with minimal relief. Pt states his pain radiates down his left arm. Not exertional. No weakness, numbness, tingling, dizziness, cp, sob. Denies any neck pain or injury.

## 2020-12-28 NOTE — ED PROVIDER NOTE - NSFOLLOWUPINSTRUCTIONS_ED_ALL_ED_FT
Follow with your PMD within 48-72 hours.  Rest, no heavy lifting.  Warm compresses to area. Recommend Ortho consult to discuss possible MRI vs Physical Therapy- referral list provided.  Light walking. Take Motrin 600 mg every 6-8 hours for pain with food, Flexeril 5mg every 8 hours as needed for muscle spasm- caution drowsiness/do not drive. Take Tylenol 650mg (Two 325 mg pills) every 4-6 hours as needed for pain.   You may also use Salonpas pain patches over the counter as needed for pain.     Any worsening pain, weakness, numbness, bowel or urinary incontinence or new concerning symptoms return to the Emergency Department.

## 2022-01-24 NOTE — ED ADULT NURSE NOTE - GENITOURINARY WDL
Medication Quantity Refills Start End   atorvastatin 10 MG Oral Tab 90 tablet 1 7/22/2020    Sig:   Take 1 tablet (10 mg total) by mouth daily.        Last OV 11/29/21  Cholesterol Medication Protocol Passed 01/24/2022 10:22 AM   Protocol Details  ALT < 80 Bladder non-tender and non-distended. Urine clear yellow.

## 2022-07-21 NOTE — ED PROVIDER NOTE - HISTORY ATTESTATION, MLM
07/21/22                            Chris Kenny  [address] [place] [id number]    To Whom It May Concern:    This is to certify Chris Kenny was evaluated with Feliciano Sanchez MD on 07/21/22 and is excused from work on 6/26/22 to 8/22/22.              Feliciano Sanchez MD  [facility] [address]  [address]  [facility] [address]  Phone: [phone]  Fax: [phone]    
I have reviewed and confirmed nurses' notes...

## 2022-11-10 NOTE — ED PROVIDER NOTE - MUSCULOSKELETAL, MLM
Spine appears normal, range of motion is not limited, no muscle or joint tenderness
Patient states no history

## 2023-01-13 NOTE — ED PROVIDER NOTE - CPE EDP GASTRO NORM
Update - he does not have prescription insurance.  The 1st month will come from [facility] with a free trial card, but beyond that he will need free drug.    I have asked Theresa Blanco to start the free drug application now.    Halima Quintana, PharmD, [facility] - Oncology Clinical Pharmacist [phone]    1/13/23  14:24 EDT   - - -

## 2024-10-23 ENCOUNTER — EMERGENCY (EMERGENCY)
Facility: HOSPITAL | Age: 34
LOS: 1 days | Discharge: HOME | End: 2024-10-23
Admitting: EMERGENCY MEDICINE
Payer: COMMERCIAL

## 2024-10-23 VITALS
TEMPERATURE: 98 F | OXYGEN SATURATION: 98 % | DIASTOLIC BLOOD PRESSURE: 81 MMHG | WEIGHT: 259.93 LBS | HEART RATE: 83 BPM | SYSTOLIC BLOOD PRESSURE: 133 MMHG | RESPIRATION RATE: 18 BRPM | HEIGHT: 67 IN

## 2024-10-23 DIAGNOSIS — M75.102 UNSPECIFIED ROTATOR CUFF TEAR OR RUPTURE OF LEFT SHOULDER, NOT SPECIFIED AS TRAUMATIC: Chronic | ICD-10-CM

## 2024-10-23 PROCEDURE — 73502 X-RAY EXAM HIP UNI 2-3 VIEWS: CPT | Mod: 26,LT

## 2024-10-23 PROCEDURE — 72100 X-RAY EXAM L-S SPINE 2/3 VWS: CPT | Mod: 26

## 2024-10-23 PROCEDURE — 99284 EMERGENCY DEPT VISIT MOD MDM: CPT

## 2024-10-23 RX ORDER — IBUPROFEN 200 MG
600 TABLET ORAL ONCE
Refills: 0 | Status: COMPLETED | OUTPATIENT
Start: 2024-10-23 | End: 2024-10-23

## 2024-10-23 RX ADMIN — Medication 600 MILLIGRAM(S): at 20:14
